# Patient Record
Sex: FEMALE | Race: WHITE
[De-identification: names, ages, dates, MRNs, and addresses within clinical notes are randomized per-mention and may not be internally consistent; named-entity substitution may affect disease eponyms.]

---

## 2018-02-14 ENCOUNTER — HOSPITAL ENCOUNTER (EMERGENCY)
Dept: HOSPITAL 80 - FED | Age: 23
Discharge: HOME | End: 2018-02-14
Payer: COMMERCIAL

## 2018-02-14 VITALS
RESPIRATION RATE: 16 BRPM | TEMPERATURE: 97.9 F | HEART RATE: 104 BPM | SYSTOLIC BLOOD PRESSURE: 114 MMHG | OXYGEN SATURATION: 97 % | DIASTOLIC BLOOD PRESSURE: 84 MMHG
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
## 2018-02-14 DIAGNOSIS — S93.401A: Primary | ICD-10-CM

## 2018-02-14 DIAGNOSIS — M24.271: ICD-10-CM

## 2018-02-14 PROCEDURE — L4386 NON-PNEUM WALK BOOT PRE CST: HCPCS

## 2018-02-14 NOTE — EDPHY
H & P


Stated Complaint: rolled r ankle


Time Seen by Provider: 02/14/18 13:23


HPI/ROS: 


HPI:  This is a 22-year-old female who presents with 





Chief Complaint:  Rolled right ankle





Location:  Right ankle


Quality:  Rolled


Duration:  2-3 days ago


Signs and Symptoms:  No bleeding, no radiation, no numbness, no weakness, no 

tingling, no incontinence, no decreased range of motion, + swelling, + pain


Timing:  Acute


Severity:  Moderate


Context:  Patient is a student at [employer] presents with 

complaints of rolling her ankle yesterday for the 2nd time.  She complains 

swelling and tenderness over her right ankle lateral malleolus.  Back in 

December she rolled her ankle as well and was given lace-up ankle brace.  She 

placed ankle brace on yesterday but no improvement in symptoms.  She has never 

had x-ray images of her ankle.  She denies paresthesias/weakness/skin color 

changes.  She is ambulatory with mild pain.  She has not take any over-the-

counter pain medication. 


Modifying Factors:  Lace-up ankle brace





Comment: 








ROS: see HPI


Constitutional: No fever, no chills, no weight loss


Eyes:  No blurred vision


Respiratory:  No shortness of breath, no cough


Cardiovascular:  No chest pain


Gastrointestinal:  No nausea, no vomiting no diarrhea 


Genitourinary:  No dysuria 


Extremities:  No myalgias 


Neurologic:  No weakness, no numbness


Skin:  No rashes


Hematologic:  No bruising, no bleeding





MEDICAL/SURGICAL/SOCIAL HISTORY: 


Medical history:  Generally healthy.  Does not take any regular medications.


Surgical history:  Denies


Social history:  Student at [employer]








CONSTITUTIONAL:  Extremely pleasant adult white female, awake and alert, no 

obvious distress


HEENT: Atraumatic and normocephalic, PERRL, EOMI. Tympanic membranes clear. 

Oropharynx clear, no exudate and moist pink mucosa.  Airway patent.  No 

lymphadenopathy.  No meningismus.


Cardiovascular: Normal S1/S2, regular rate, regular rhythm, without murmur rub 

or gallop.


PULMONARY/CHEST:  Symmetrical and nontender. Clear to auscultation bilaterally. 

Good air movement. No accessory muscle usage.


ABDOMEN:  Soft, nondistended, nontender, no rebound, no guarding, no peritoneal 

signs, no masses or organomegaly. No CVAT.


EXTREMITIES:  2/2 DP and PT pulses, strength 5/5, Ankle: Plantar flexion to 50

, dorsiflexion to 20.  Foot inversion to 35 degree.  Mild tenderness/swelling 

Anterior talofibular ligament.  No tenderness/swelling Calcaneofibular ligament

, no tenderness/swelling posterior talofibular ligament, no tenderness/swelling 

posterior inferior tibiofibular ligament. Achilles tendon intact. no deformities

, no clubbing, no cyanosis or edema.


NEUROLOGICAL: no focal neuro deficits.  GCS 15.


SKIN: Warm and dry, no erythema. no rash.  Good capillary refill. 


  





Source: Patient


Exam Limitations: No limitations





- Personal History


LMP (Females 10-55): Extended Cycle BCP/Inj


Current Tetanus/Diphtheria Vaccine: Yes





- Medical/Surgical History


Hx Asthma: No


Hx Chronic Respiratory Disease: No


Hx Diabetes: No


Hx Cardiac Disease: No


Hx Renal Disease: No


Hx Cirrhosis: No


Hx Alcoholism: No


Hx HIV/AIDS: No


Hx Splenectomy or Spleen Trauma: No


Other PMH: denies





- Social History


Smoking Status: Never smoked


Constitutional: 


 Initial Vital Signs











Temperature (C)  36.6 C   02/14/18 13:13


 


Heart Rate  104 H  02/14/18 13:13


 


Respiratory Rate  16   02/14/18 13:13


 


Blood Pressure  114/84 H  02/14/18 13:13


 


O2 Sat (%)  97   02/14/18 13:13








 











O2 Delivery Mode               Room Air














Allergies/Adverse Reactions: 


 





No Known Allergies Allergy (Unverified 02/14/18 13:12)


 








Home Medications: 














 Medication  Instructions  Recorded


 


Nexplanon  02/14/18














Medical Decision Making





- Diagnostics


Imaging Results: 


 Imaging Impressions





Ankle X-Ray  02/14/18 13:24


Impression: Ankle sprain.











Procedures: 


Procedure:  Splint placement.





A right walking boot was applied by the Emergency Room technician.  After 

application of the splint I returned and re-examined the patient.  The splint 

was adequately immobilizing the joint and distal to the splint the patient's 

circulation and sensation was intact.





ED Course/Re-evaluation: 


Right ankle x-ray ordered, pack applied, Given ibuprofen 800 mg


Suspect grade 2 ATFL ligament sprain.  Will likely placed in walking boot with 

crutches.  Rice therapy. 


No signs of neurovascular compromise/tenting of skin/compartment syndrome/

extremities and joints examined above and below area of concern and are 

neurovascularly intact.


X-ray my read shows no acute fracture; dislocation.  Soft tissue swelling noted 

over the lateral aspect.








This patient was seen under the supervision of my secondary supervising 

physician.  I evaluated care for this patient independently.  








Differential Diagnosis: 


Differential diagnosis includes but is not limited to ATFL ligament sprain, 

nerve injury, tibia fracture, fibula fracture, midfoot fracture. 








- Data Points


Medications Given: 


 








Discontinued Medications





Ibuprofen (Motrin)  800 mg PO EDNOW ONE


   Stop: 02/14/18 13:27


   Last Admin: 02/14/18 13:55 Dose:  800 mg








Departure





- Departure


Disposition: Home, Routine, Self-Care


Clinical Impression: 


 Ligamentous inflammation





Grade 2 ankle sprain


Qualifiers:


 Encounter type: initial encounter Laterality: right Qualified Code(s): 

S93.401A - Sprain of unspecified ligament of right ankle, initial encounter





Condition: Good


Instructions:  Ankle Sprain (ED)


Additional Instructions: 


Wear the walking boot while out of bed until pain free or seen by Orthopedics 

for follow-up. 


Take Tylenol 650 mg every 4 hours and/or Ibuprofen 600 mg every 8 hours with 

food as needed for pain. 


Apply ice for 30 minutes at a time; 2-3 times per day for the next 1-2 days. 


Follow up with Orthopedics in 2-4 weeks if symptoms persist or worsening at 

which time they will evaluate and recommend with you if conservative management 

versus adjuvant therapy like further imaging is indicated. 


The x-rays obtained in the emergency department today demonstrate no evidence 

of an obvious fracture.  


Referrals: 


Richard Worley MD [Medical Doctor] - As per Instructions